# Patient Record
Sex: MALE | Race: WHITE | Employment: FULL TIME | URBAN - METROPOLITAN AREA
[De-identification: names, ages, dates, MRNs, and addresses within clinical notes are randomized per-mention and may not be internally consistent; named-entity substitution may affect disease eponyms.]

---

## 2018-07-21 ENCOUNTER — HOSPITAL ENCOUNTER (EMERGENCY)
Age: 33
Discharge: HOME | End: 2018-07-21
Attending: FAMILY MEDICINE
Payer: COMMERCIAL

## 2018-07-21 VITALS
DIASTOLIC BLOOD PRESSURE: 86 MMHG | BODY MASS INDEX: 41.64 KG/M2 | TEMPERATURE: 97.6 F | SYSTOLIC BLOOD PRESSURE: 142 MMHG | WEIGHT: 314.2 LBS | OXYGEN SATURATION: 100 % | HEIGHT: 73 IN | RESPIRATION RATE: 14 BRPM | HEART RATE: 72 BPM

## 2018-07-21 DIAGNOSIS — S39.012A STRAIN OF LUMBAR REGION, INITIAL ENCOUNTER: ICD-10-CM
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
## 2018-07-21 PROCEDURE — 99283 EMERGENCY DEPT VISIT LOW MDM: CPT

## 2018-07-21 RX ORDER — DEXAMETHASONE 4 MG/1
4 TABLET ORAL ONCE
Status: DISCONTINUED | OUTPATIENT
Start: 2018-07-21 | End: 2018-07-22 | Stop reason: HOSPADM

## 2018-07-21 RX ORDER — ACETAMINOPHEN 500 MG
1000 TABLET ORAL ONCE
Status: DISCONTINUED | OUTPATIENT
Start: 2018-07-21 | End: 2018-07-22 | Stop reason: HOSPADM

## 2018-07-21 RX ORDER — NAPROXEN 500 MG/1
500 TABLET ORAL 2 TIMES DAILY WITH MEALS
Qty: 10 TABLET | Refills: 0 | Status: SHIPPED | OUTPATIENT
Start: 2018-07-21 | End: 2020-03-07

## 2018-07-21 RX ORDER — CYCLOBENZAPRINE HCL 10 MG
10 TABLET ORAL 3 TIMES DAILY PRN
Qty: 6 TABLET | Refills: 0 | Status: SHIPPED | OUTPATIENT
Start: 2018-07-21 | End: 2018-07-24

## 2018-07-21 RX ORDER — CYCLOBENZAPRINE HCL 10 MG
10 TABLET ORAL ONCE
Status: DISCONTINUED | OUTPATIENT
Start: 2018-07-21 | End: 2018-07-22 | Stop reason: HOSPADM

## 2018-07-21 ASSESSMENT — PAIN DESCRIPTION - ORIENTATION: ORIENTATION: RIGHT

## 2018-07-21 ASSESSMENT — PAIN DESCRIPTION - PAIN TYPE
TYPE: ACUTE PAIN
TYPE: ACUTE PAIN

## 2018-07-21 ASSESSMENT — PAIN DESCRIPTION - DESCRIPTORS
DESCRIPTORS: ACHING
DESCRIPTORS: ACHING

## 2018-07-21 ASSESSMENT — PAIN DESCRIPTION - LOCATION
LOCATION: BACK
LOCATION: BACK;HEAD

## 2018-07-21 ASSESSMENT — PAIN - FUNCTIONAL ASSESSMENT: PAIN_FUNCTIONAL_ASSESSMENT: 0-10

## 2018-07-21 ASSESSMENT — PAIN SCALES - GENERAL
PAINLEVEL_OUTOF10: 5
PAINLEVEL_OUTOF10: 1

## 2018-07-21 ASSESSMENT — PAIN DESCRIPTION - FREQUENCY: FREQUENCY: CONTINUOUS

## 2018-07-22 NOTE — ED PROVIDER NOTES
Triage Chief Complaint:   Fall (fell down steps hit back and head no loc)    [place]:  Flower Lynch is a 26 y.o. male that presents who approximately 3 hours ago slipped on a step and bounced down 3 steps. He also hit the back of his head. No loss of conscious. No neck pain. No upper extremity lower extremity radicular symptoms. No nausea vomiting. No chest or abdominal pain. No upper extremity pain. No lower extremity pain. He is had normal activity and has been eating and drinking normally since then. He presents because the last time he had a similar injury when he woke the next day he had muscle spasms in his back and he would like to avoid this. ROS:  General:  no weakness  Eyes:  No recent vison changes  ENT:  No sore throat, no nasal congestion, no hearing changes  Cardiovascular:  No chest pain  Respiratory:  No shortness of breath  Gastrointestinal:  No pain, no nausea, no vomiting  Musculoskeletal:  No muscle pain, no joint pain  Skin:  No rash, No wounds  Neurologic:  No speech problems, no headache, no extremity numbness, no extremity tingling, no extremity weakness  Genitourinary: no hematuria  Extremities:  no edema, no pain    Past Medical History:   Diagnosis Date    Asthma      History reviewed. No pertinent surgical history. History reviewed. No pertinent family history. Social History     Social History    Marital status:      Spouse name: N/A    Number of children: N/A    Years of education: N/A     Occupational History    Not on file. Social History Main Topics    Smoking status: Current Every Day Smoker     Packs/day: 0.50     Types: Cigarettes    Smokeless tobacco: Not on file    Alcohol use Not on file    Drug use: No    Sexual activity: Not on file     Other Topics Concern    Not on file     Social History Narrative    No narrative on file     No current facility-administered medications for this encounter.       Current Outpatient Prescriptions Radiographs (if obtained):  [] The following radiograph was interpreted by myself in the absence of a radiologist:   [] Radiologist's Report Reviewed:  No orders to display         EKG (if obtained): (All EKG's are interpreted by myself in the absence of a cardiologist)    Chart review shows recent radiographs:  No results found. MDM:  49-year-old male with history and physical as above. His physical exam is essentially entirely normal.  Head CT not performed as he is negative by [place] criteria. Cervical spine imaging not performed as he is negative by nexus ×5. He has no midline thoracic or lumbar tenderness. He has no bruising. He has no evidence of radicular symptoms. His gait is normal.  He has no ataxia. He is mentating normally. Patient discharged home with p.r.n. Flexeril for 3 days as well as scheduled Naprosyn. Continuing to remain active and not lying in bed, continuing to work with no time off, and return to the emergency department instructions reviewed with the patient and his wife. I estimate there is LOW risk for (including but not limited to) INTRACRANIAL HEMORRHAGE, CENTRAL CORD SYNDROME, UNSTABLE SPINE FRACTURE, AORTIC DISSECTION, PERFORATED VISCUS, SOLID ORGAN LACERATION, CAUDA EQUINA, UNSTABLE PELVIC FRACTURE, COMPARTMENT SYNDROME, ACUTE ARTERIAL INJURY, or OPEN FRACTURE thus I consider the discharge disposition reasonable. Michell Sloan (or their surrogate) and I have discussed the diagnosis and risks, and we agree with discharging home with close follow-up. We also discussed returning to the Emergency Department immediately if new or worsening symptoms occur. We have discussed the symptoms which are most concerning that necessitate immediate return. Clinical Impression:  1. Fall, initial encounter    2. Strain of lumbar region, initial encounter      Disposition referral (if applicable):  Richard Weber MD  [address] [id number]  [phone]    Schedule an appointment as soon as possible for a visit       Disposition medications (if applicable):  Discharge Medication List as of 7/21/2018 11:12 PM      START taking these medications    Details   cyclobenzaprine (FLEXERIL) 10 MG tablet Take 1 tablet by mouth 3 times daily as needed for Muscle spasms, Disp-6 tablet, R-0Print      naproxen (NAPROSYN) 500 MG tablet Take 1 tablet by mouth 2 times daily (with meals) for 5 days, Disp-10 tablet, R-0Print             Comment: Please note this report has been produced using speech recognition software and may contain errors related to that system including errors in grammar, punctuation, and spelling, as well as words and phrases that may be inappropriate. If there are any questions or concerns please feel free to contact the dictating provider for clarification.       Juan F Braga MD  07/22/18 1369

## 2020-03-07 ENCOUNTER — HOSPITAL ENCOUNTER (EMERGENCY)
Age: 35
Discharge: HOME | End: 2020-03-08
Attending: EMERGENCY MEDICINE
Payer: COMMERCIAL

## 2020-03-07 VITALS
WEIGHT: 313.4 LBS | OXYGEN SATURATION: 98 % | HEART RATE: 92 BPM | HEIGHT: 73 IN | TEMPERATURE: 98.3 F | SYSTOLIC BLOOD PRESSURE: 137 MMHG | BODY MASS INDEX: 41.54 KG/M2 | DIASTOLIC BLOOD PRESSURE: 91 MMHG

## 2020-03-07 PROCEDURE — 99282 EMERGENCY DEPT VISIT SF MDM: CPT

## 2020-03-08 NOTE — ED NOTES
Pt to ed with c/o blood in stool, states has it last wk and more tonight.  Exam per MD.     Мария Mueller RN  03/07/20 9844

## 2020-03-17 ENCOUNTER — HOSPITAL ENCOUNTER (OUTPATIENT)
Age: 35
Setting detail: OUTPATIENT SURGERY
Discharge: HOME | End: 2020-03-17
Attending: INTERNAL MEDICINE | Admitting: INTERNAL MEDICINE
Payer: COMMERCIAL

## 2020-03-17 VITALS
HEART RATE: 56 BPM | SYSTOLIC BLOOD PRESSURE: 98 MMHG | DIASTOLIC BLOOD PRESSURE: 64 MMHG | OXYGEN SATURATION: 97 % | RESPIRATION RATE: 20 BRPM

## 2020-03-17 PROCEDURE — 2709999900 HC NON-CHARGEABLE SUPPLY: Performed by: INTERNAL MEDICINE

## 2020-03-17 PROCEDURE — 3609010200 HC COLONOSCOPY ABLATION TUMOR POLYP/OTHER LES: Performed by: INTERNAL MEDICINE

## 2020-03-17 PROCEDURE — 99153 MOD SED SAME PHYS/QHP EA: CPT | Performed by: INTERNAL MEDICINE

## 2020-03-17 PROCEDURE — 3609010300 HC COLONOSCOPY W/BIOPSY SINGLE/MULTIPLE: Performed by: INTERNAL MEDICINE

## 2020-03-17 PROCEDURE — 7100000011 HC PHASE II RECOVERY - ADDTL 15 MIN: Performed by: INTERNAL MEDICINE

## 2020-03-17 PROCEDURE — 2720000010 HC SURG SUPPLY STERILE: Performed by: INTERNAL MEDICINE

## 2020-03-17 PROCEDURE — 3609012400 HC EGD TRANSORAL BIOPSY SINGLE/MULTIPLE: Performed by: INTERNAL MEDICINE

## 2020-03-17 PROCEDURE — 6360000002 HC RX W HCPCS: Performed by: INTERNAL MEDICINE

## 2020-03-17 PROCEDURE — 6370000000 HC RX 637 (ALT 250 FOR IP): Performed by: INTERNAL MEDICINE

## 2020-03-17 PROCEDURE — 88305 TISSUE EXAM BY PATHOLOGIST: CPT

## 2020-03-17 PROCEDURE — 7100000010 HC PHASE II RECOVERY - FIRST 15 MIN: Performed by: INTERNAL MEDICINE

## 2020-03-17 PROCEDURE — 2500000003 HC RX 250 WO HCPCS: Performed by: INTERNAL MEDICINE

## 2020-03-17 PROCEDURE — 99152 MOD SED SAME PHYS/QHP 5/>YRS: CPT | Performed by: INTERNAL MEDICINE

## 2020-03-17 RX ORDER — MUPIROCIN CALCIUM 20 MG/G
CREAM TOPICAL 3 TIMES DAILY
COMMUNITY
End: 2022-08-19

## 2020-03-17 RX ORDER — SODIUM CHLORIDE 9 MG/ML
INJECTION, SOLUTION INTRAVENOUS CONTINUOUS
Status: DISCONTINUED | OUTPATIENT
Start: 2020-03-17 | End: 2020-03-17 | Stop reason: HOSPADM

## 2020-03-17 RX ORDER — TERBINAFINE HYDROCHLORIDE 250 MG/1
250 TABLET ORAL DAILY
COMMUNITY
Start: 2020-02-29 | End: 2022-08-19

## 2020-03-17 RX ORDER — FENTANYL CITRATE 50 UG/ML
INJECTION, SOLUTION INTRAMUSCULAR; INTRAVENOUS PRN
Status: DISCONTINUED | OUTPATIENT
Start: 2020-03-17 | End: 2020-03-17 | Stop reason: ALTCHOICE

## 2020-03-17 RX ORDER — MIDAZOLAM HYDROCHLORIDE 1 MG/ML
INJECTION INTRAMUSCULAR; INTRAVENOUS PRN
Status: DISCONTINUED | OUTPATIENT
Start: 2020-03-17 | End: 2020-03-17 | Stop reason: ALTCHOICE

## 2020-03-17 ASSESSMENT — PAIN - FUNCTIONAL ASSESSMENT
PAIN_FUNCTIONAL_ASSESSMENT: 0-10
PAIN_FUNCTIONAL_ASSESSMENT: 0-10
PAIN_FUNCTIONAL_ASSESSMENT: FACES
PAIN_FUNCTIONAL_ASSESSMENT: 0-10

## 2020-03-17 NOTE — H&P
History and Physical / Pre-Sedation Assessment    Patient:  Lizzeth Liu   :   1985     Intended Procedure:  egd and colonoscopy    HPI: rectal bleed. Red and dark  Diarrhea. Abdominal pain. Past Medical History:   has a past medical history of Asthma. Past Surgical History:   has a past surgical history that includes Pilonidal cyst excision (); Dental surgery; and Colonoscopy (). Medications:  Prior to Admission medications    Medication Sig Start Date End Date Taking? Authorizing Provider   terbinafine (LAMISIL) 250 MG tablet Take 250 mg by mouth daily 20   Historical Provider, MD   mupirocin (BACTROBAN) 2 % cream Apply topically 3 times daily Apply topically 3 times daily. Historical Provider, MD       Family History:  family history is not on file. Social History:   reports that he has been smoking cigarettes. He has been smoking about 0.50 packs per day. He has never used smokeless tobacco. He reports that he does not use drugs. Allergies:  No known allergies    ROS:  twelve point system review was unremarkable except for above noted history. Nurses notes reviewed and agreed. Medications reviewed    Physical Exam:  Vital Signs: There were no vitals taken for this visit. Skin: normal  HEENT: normal  Neck: supple. No adenopathy. No thyromegaly. No JVD. Pulmonary:Normal  Cardiac:Normal  Abdomen:Normal  MS: normal  Neuro: normal  Ext: no edema. Pulses normal    Pre-Procedure Assessment / Plan:  ASA 2 - Patient with mild systemic disease with no functional limitations  Mallampati Airway Assessment:  Mallampati Class I - (soft palate, fauces, uvula & anterior/posterior tonsillar pillars are visible)  Level of Sedation Plan: Moderate sedation  Post Procedure plan: Return to same level of care    I assessed the patient and find that the patient is in satisfactory condition to proceed with the planned procedure and sedation plan.     I have explained the risk, benefits,

## 2020-03-18 NOTE — OP NOTE
[address]               [address] [place], [address]                                OPERATIVE REPORT    PATIENT NAME: Melissa Mock                      :        1985  MED REC NO:   [id number]                          ROOM:  ACCOUNT NO:   [de-identified]                           ADMIT DATE: 2020  PROVIDER:     Kayley Brito MD    DATE OF PROCEDURE:  2020    SURGEON:  Kayley Brito MD    INDICATION FOR PROCEDURE:  A 49-year-old man who presented with  diarrhea, rectal bleed, and abdominal pain. He described melena and red  blood with the stool. Today, he is having endoscopic evaluation. EGD:  With the patient in the left lateral position and after sedation  with 100 mcg of fentanyl and 7 mg of Versed IV, the Olympus video  endoscope was introduced into the esophagus and advanced toward the  stomach. Esophagus was normal.  Stomach was carefully inspected. It  was normal.  Biopsies were obtained for Helicobacter pylori. A small  polyp was also removed with the biopsy forceps. The duodenum was  normal.  Scope was then removed without complication. COLONOSCOPY:  The Olympus video colonoscope was then inserted into the  rectum and carefully advanced to the cecum. There was no sign of  carcinoma, inflammatory bowel, or polyps. An angiodysplasia was noted  in the transverse colon. APC ablation was performed. Random biopsies  were obtained to rule out collagenous/lymphocytic/microscopic colitis. The scope was then removed without complication. IMPRESSION:  1. Normal esophagogastroduodenoscopy. 2.  Angiodysplasia of the transverse colon. APC ablation was performed. Kenroy Wood MD    D: 2020 13:32:48       T: 2020 15:34:00     [email or web address]  Job#: [id number]     Doc#: [id number]    CC:   Sherly Hale MD

## 2020-03-29 ENCOUNTER — HOSPITAL ENCOUNTER (EMERGENCY)
Age: 35
Discharge: HOME | End: 2020-03-29
Attending: EMERGENCY MEDICINE
Payer: COMMERCIAL

## 2020-03-29 VITALS
OXYGEN SATURATION: 100 % | TEMPERATURE: 97.7 F | WEIGHT: 315 LBS | BODY MASS INDEX: 41.75 KG/M2 | HEIGHT: 73 IN | HEART RATE: 63 BPM | SYSTOLIC BLOOD PRESSURE: 130 MMHG | RESPIRATION RATE: 12 BRPM | DIASTOLIC BLOOD PRESSURE: 91 MMHG

## 2020-03-29 LAB — S PYO AG THROAT QL: NEGATIVE

## 2020-03-29 PROCEDURE — 87880 STREP A ASSAY W/OPTIC: CPT

## 2020-03-29 PROCEDURE — 87081 CULTURE SCREEN ONLY: CPT

## 2020-03-29 PROCEDURE — 99283 EMERGENCY DEPT VISIT LOW MDM: CPT

## 2020-03-31 LAB — S PYO THROAT QL CULT: NORMAL

## 2020-06-04 ENCOUNTER — OFFICE VISIT (OUTPATIENT)
Dept: PRIMARY CARE CLINIC | Age: 35
End: 2020-06-04

## 2020-06-06 LAB
SARS-COV-2: NOT DETECTED
SOURCE: NORMAL

## 2020-06-08 ENCOUNTER — ANESTHESIA EVENT (OUTPATIENT)
Dept: ENDOSCOPY | Age: 35
End: 2020-06-08
Payer: COMMERCIAL

## 2020-06-10 ENCOUNTER — HOSPITAL ENCOUNTER (OUTPATIENT)
Age: 35
Setting detail: OUTPATIENT SURGERY
Discharge: HOME | End: 2020-06-10
Attending: INTERNAL MEDICINE | Admitting: INTERNAL MEDICINE
Payer: COMMERCIAL

## 2020-06-10 ENCOUNTER — ANESTHESIA (OUTPATIENT)
Dept: ENDOSCOPY | Age: 35
End: 2020-06-10
Payer: COMMERCIAL

## 2020-06-10 VITALS
TEMPERATURE: 97.2 F | OXYGEN SATURATION: 99 % | HEIGHT: 73 IN | DIASTOLIC BLOOD PRESSURE: 76 MMHG | SYSTOLIC BLOOD PRESSURE: 118 MMHG | BODY MASS INDEX: 41.75 KG/M2 | RESPIRATION RATE: 16 BRPM | HEART RATE: 64 BPM | WEIGHT: 315 LBS

## 2020-06-10 VITALS
RESPIRATION RATE: 17 BRPM | DIASTOLIC BLOOD PRESSURE: 73 MMHG | SYSTOLIC BLOOD PRESSURE: 112 MMHG | OXYGEN SATURATION: 98 %

## 2020-06-10 PROCEDURE — 7100000010 HC PHASE II RECOVERY - FIRST 15 MIN: Performed by: INTERNAL MEDICINE

## 2020-06-10 PROCEDURE — 6360000002 HC RX W HCPCS: Performed by: NURSE ANESTHETIST, CERTIFIED REGISTERED

## 2020-06-10 PROCEDURE — 2580000003 HC RX 258: Performed by: ANESTHESIOLOGY

## 2020-06-10 PROCEDURE — 3700000001 HC ADD 15 MINUTES (ANESTHESIA): Performed by: INTERNAL MEDICINE

## 2020-06-10 PROCEDURE — 3609014000 HC ENTEROSCOPY BIOPSY: Performed by: INTERNAL MEDICINE

## 2020-06-10 PROCEDURE — 7100000011 HC PHASE II RECOVERY - ADDTL 15 MIN: Performed by: INTERNAL MEDICINE

## 2020-06-10 PROCEDURE — 2709999900 HC NON-CHARGEABLE SUPPLY: Performed by: INTERNAL MEDICINE

## 2020-06-10 PROCEDURE — 88305 TISSUE EXAM BY PATHOLOGIST: CPT

## 2020-06-10 PROCEDURE — 3700000000 HC ANESTHESIA ATTENDED CARE: Performed by: INTERNAL MEDICINE

## 2020-06-10 RX ORDER — PROPOFOL 10 MG/ML
INJECTION, EMULSION INTRAVENOUS PRN
Status: DISCONTINUED | OUTPATIENT
Start: 2020-06-10 | End: 2020-06-10 | Stop reason: SDUPTHER

## 2020-06-10 RX ORDER — SODIUM CHLORIDE, SODIUM LACTATE, POTASSIUM CHLORIDE, CALCIUM CHLORIDE 600; 310; 30; 20 MG/100ML; MG/100ML; MG/100ML; MG/100ML
INJECTION, SOLUTION INTRAVENOUS CONTINUOUS
Status: DISCONTINUED | OUTPATIENT
Start: 2020-06-10 | End: 2020-06-10 | Stop reason: HOSPADM

## 2020-06-10 RX ORDER — LIDOCAINE HYDROCHLORIDE 20 MG/ML
INJECTION, SOLUTION INTRAVENOUS PRN
Status: DISCONTINUED | OUTPATIENT
Start: 2020-06-10 | End: 2020-06-10 | Stop reason: SDUPTHER

## 2020-06-10 RX ADMIN — LIDOCAINE HYDROCHLORIDE 100 MG: 20 INJECTION, SOLUTION INTRAVENOUS at 09:33

## 2020-06-10 RX ADMIN — PROPOFOL 200 MG: 10 INJECTION, EMULSION INTRAVENOUS at 09:33

## 2020-06-10 RX ADMIN — SODIUM CHLORIDE, POTASSIUM CHLORIDE, SODIUM LACTATE AND CALCIUM CHLORIDE: 600; 310; 30; 20 INJECTION, SOLUTION INTRAVENOUS at 08:00

## 2020-06-10 RX ADMIN — SODIUM CHLORIDE, SODIUM LACTATE, POTASSIUM CHLORIDE, AND CALCIUM CHLORIDE: 600; 310; 30; 20 INJECTION, SOLUTION INTRAVENOUS at 09:03

## 2020-06-10 RX ADMIN — PROPOFOL 100 MG: 10 INJECTION, EMULSION INTRAVENOUS at 09:41

## 2020-06-10 ASSESSMENT — LIFESTYLE VARIABLES: SMOKING_STATUS: 1

## 2020-06-10 ASSESSMENT — PULMONARY FUNCTION TESTS
PIF_VALUE: 0
PIF_VALUE: 1
PIF_VALUE: 0
PIF_VALUE: 0

## 2020-06-10 ASSESSMENT — PAIN SCALES - GENERAL
PAINLEVEL_OUTOF10: 0

## 2020-06-10 ASSESSMENT — PAIN - FUNCTIONAL ASSESSMENT: PAIN_FUNCTIONAL_ASSESSMENT: 0-10

## 2020-06-10 NOTE — ANESTHESIA PRE PROCEDURE
Cardiovascular:  Exercise tolerance: good (>4 METS),       (-) hypertension, past MI,  angina and  WANG                Neuro/Psych:      (-) seizures           GI/Hepatic/Renal:   (+) morbid obesity     (-) GERD       Endo/Other:        (-) diabetes mellitus               Abdominal:           Vascular:                                        Anesthesia Plan      general     ASA 3     (-npo MN  -denies any cardiac history, no chest pain or palp  )  Induction: intravenous. MIPS: Postoperative opioids intended. Anesthetic plan and risks discussed with patient. Plan discussed with CRNA.     Attending anesthesiologist reviewed and agrees with Pre Eval content            Silvia Wise MD   6/10/2020

## 2020-06-10 NOTE — PROGRESS NOTES
56 Dr. Love Loss here to see pt. DC instructions given to pt and wife with verbalization of understanding of pt and wife. Ambulatory Surgery/Procedure Discharge Note    Vitals:    06/10/20 1030   BP: 118/76   Pulse: 64   Resp: 16   Temp: 97.2 °F (36.2 °C)   SpO2:        In: 620 [P.O.:320; I.V.:300]  Out: -     Restroom use offered before discharge. Yes    Pain assessment:  level of pain (1-10, 10 severe),   Pain Level: 0    No c/o distress. Tolerates po well. Drinking applejuice and eating crackers. Pt and wife states 'ready to go home'. Patient discharged to home/self care.  Patient discharged via wheel chair by transporter to waiting family/S.O.       6/10/2020 10:42 AM

## 2022-08-19 ENCOUNTER — HOSPITAL ENCOUNTER (EMERGENCY)
Age: 37
Discharge: HOME | End: 2022-08-19
Attending: EMERGENCY MEDICINE
Payer: COMMERCIAL

## 2022-08-19 VITALS
WEIGHT: 285.7 LBS | SYSTOLIC BLOOD PRESSURE: 132 MMHG | DIASTOLIC BLOOD PRESSURE: 77 MMHG | BODY MASS INDEX: 37.86 KG/M2 | HEIGHT: 73 IN | OXYGEN SATURATION: 99 % | HEART RATE: 99 BPM | RESPIRATION RATE: 16 BRPM | TEMPERATURE: 98.8 F

## 2022-08-19 DIAGNOSIS — S60.512A CAT SCRATCH OF LEFT HAND, INITIAL ENCOUNTER: Primary | ICD-10-CM
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
## 2022-08-19 PROCEDURE — 99283 EMERGENCY DEPT VISIT LOW MDM: CPT

## 2022-08-19 PROCEDURE — 6370000000 HC RX 637 (ALT 250 FOR IP): Performed by: EMERGENCY MEDICINE

## 2022-08-19 RX ORDER — CLINDAMYCIN HYDROCHLORIDE 300 MG/1
300 CAPSULE ORAL ONCE
Status: COMPLETED | OUTPATIENT
Start: 2022-08-19 | End: 2022-08-19

## 2022-08-19 RX ORDER — CLINDAMYCIN HYDROCHLORIDE 300 MG/1
300 CAPSULE ORAL 4 TIMES DAILY
Qty: 40 CAPSULE | Refills: 0 | Status: SHIPPED | OUTPATIENT
Start: 2022-08-19 | End: 2022-08-29

## 2022-08-19 RX ORDER — AMOXICILLIN AND CLAVULANATE POTASSIUM 875; 125 MG/1; MG/1
1 TABLET, FILM COATED ORAL 2 TIMES DAILY
Qty: 20 TABLET | Refills: 0 | Status: SHIPPED | OUTPATIENT
Start: 2022-08-19 | End: 2022-08-29

## 2022-08-19 RX ORDER — AMOXICILLIN AND CLAVULANATE POTASSIUM 875; 125 MG/1; MG/1
1 TABLET, FILM COATED ORAL ONCE
Status: COMPLETED | OUTPATIENT
Start: 2022-08-19 | End: 2022-08-19

## 2022-08-19 RX ADMIN — CLINDAMYCIN HYDROCHLORIDE 300 MG: 300 CAPSULE ORAL at 02:11

## 2022-08-19 RX ADMIN — AMOXICILLIN AND CLAVULANATE POTASSIUM 1 TABLET: 875; 125 TABLET, FILM COATED ORAL at 02:11

## 2022-08-19 ASSESSMENT — ENCOUNTER SYMPTOMS
FACIAL SWELLING: 0
WHEEZING: 0
VOMITING: 0
TROUBLE SWALLOWING: 0
BLOOD IN STOOL: 0
COLOR CHANGE: 0
BACK PAIN: 0
NAUSEA: 0
VOICE CHANGE: 0
STRIDOR: 0
PHOTOPHOBIA: 0
ABDOMINAL PAIN: 0
SHORTNESS OF BREATH: 0

## 2022-08-19 ASSESSMENT — PAIN - FUNCTIONAL ASSESSMENT: PAIN_FUNCTIONAL_ASSESSMENT: NONE - DENIES PAIN

## 2022-08-19 NOTE — ED PROVIDER NOTES
[address] [facility]  eMERGENCY dEPARTMENT eNCOUnter      Pt Name: Gamal Meyer  MRN: [id number]  [place] 1985  Date of evaluation: 8/19/2022  Provider: Gold Rao MD    CHIEF COMPLAINT     Cat scratch to left hand      HISTORY OF PRESENT ILLNESS   (Location/Symptom, Timing/Onset, Context/Setting, Quality, Duration, Modifying Factors, Severity)  Note limiting factors. Gamal Meyer is a 42 y.o. male who presents with pain and redness to his left pinky finger after being scratched by his cat 3 days ago. The patient reports 2 days ago he was breaking up a fight between his Dog and his cat scratched him. He reports there was no bite but only a scratch. He reports mild spreading redness versus this incident. He denies any fast spreading redness, fever, or pus drainage. He denies any numbness or weakness. He reports that his symptoms are mild constant and slowly worsening. HPI    Nursing Notes were reviewed. REVIEW OFSYSTEMS    (2-9 systems for level 4, 10 or more for level 5)     Review of Systems   Constitutional:  Negative for appetite change, fever and unexpected weight change. HENT:  Negative for facial swelling, trouble swallowing and voice change. Eyes:  Negative for photophobia and visual disturbance. Respiratory:  Negative for shortness of breath, wheezing and stridor. Cardiovascular:  Negative for chest pain and palpitations. Gastrointestinal:  Negative for abdominal pain, blood in stool, nausea and vomiting. Genitourinary:  Negative for difficulty urinating and dysuria. Musculoskeletal:  Negative for back pain, gait problem and neck pain. Skin:  Positive for rash. Negative for color change and wound. Neurological:  Negative for seizures, syncope and speech difficulty. Psychiatric/Behavioral:  Negative for self-injury and suicidal ideas. Except as noted above the remainder of the review of systems was reviewed and negative.        PAST MEDICAL HISTORY     Past Medical History:   Diagnosis Date    Asthma     LAST FLARE UP 2 YEARS AGO. Black stools     Diarrhea     Overweight          SURGICAL HISTORY       Past Surgical History:   Procedure Laterality Date    COLONOSCOPY  2007    COLONOSCOPY N/A 3/17/2020    COLONOSCOPY  ABLATION TO TRANSVERSE COLON AVM performed by Nevaeh Humphrey MD at [facility]  3/17/2020    COLONOSCOPY WITH BIOPSY performed by Nevaeh Humphrey MD at [address] [place] [address] CYST EXCISION  2007    UPPER GASTROINTESTINAL ENDOSCOPY N/A 3/17/2020    EGD BIOPSY performed by Nevaeh Humphrey MD at [address] [place] [address] 6/10/2020    ENTEROSCOPY PUSH BIOPSY performed by Nevaeh Humphrey MD at [facility]       Previous Medications    MUPIROCIN (BACTROBAN) 2 % CREAM    Apply topically 3 times daily Apply topically 3 times daily. TERBINAFINE (LAMISIL) 250 MG TABLET    Take 250 mg by mouth daily       ALLERGIES     No known allergies    FAMILY HISTORY     No family history on file. SOCIAL HISTORY       Social History     Socioeconomic History    Marital status:    Tobacco Use    Smoking status: Every Day     Packs/day: 0.50     Types: Cigarettes    Smokeless tobacco: Never   Substance and Sexual Activity    Alcohol use: Not Currently    Drug use: No         PHYSICAL EXAM    (up to 7 for level 4, 8 or more for level 5)     ED Triage Vitals [08/19/22 0115]   BP Temp Temp Source Heart Rate Resp SpO2 Height Weight   132/77 98.8 °F (37.1 °C) Oral 99 16 99 % 6' 1\" (1.854 m) 285 lb 11.2 oz (129.6 kg)       Physical Exam  Vitals and nursing note reviewed. Constitutional:       General: He is not in acute distress. Appearance: He is well-developed. HENT:      Head: Normocephalic and atraumatic. Eyes:      Conjunctiva/sclera: Conjunctivae normal.   Neck:      Vascular: No JVD. Trachea: No tracheal deviation. Cardiovascular:      Rate and Rhythm: Normal rate. Pulses: Normal pulses. Comments: 2+ radial ulnar pulses to left upper extremity. Cap refill intact to fingers of left hand including left pinky. Pulmonary:      Effort: Pulmonary effort is normal. No respiratory distress. Musculoskeletal:         General: Swelling and tenderness present. Comments: Pain erythema tenderness and swelling to left pinky finger. Patient able to fully extend and bend pinky finger. No fusiform swelling. No red streaking or purulent drainage. Skin:     General: Skin is warm and dry. Neurological:      General: No focal deficit present. Mental Status: He is alert and oriented to person, place, and time. Comments: Sensation present in ulnar, median, radial nerve distribution of the left upper extremity     EMERGENCY DEPARTMENT COURSE and DIFFERENTIAL DIAGNOSIS/MDM:   Vitals:    Vitals:    08/19/22 0115   BP: 132/77   Pulse: 99   Resp: 16   Temp: 98.8 °F (37.1 °C)   TempSrc: Oral   SpO2: 99%   Weight: 285 lb 11.2 oz (129.6 kg)   Height: 6' 1\" (1.854 m)     MDM  All vital signs within normal limits. Patient neurovascularly intact. Suspect mild cellulitis however broader differential diagnosis considered and discussed with the patient including flexor tenosynovitis, necrotizing soft tissue infection, sepsis, or abscess. We have discussed different options including laboratory evaluation and possible IV antibiotics however the patient reports he prefers to try p.o. antibiotics and will come back if his symptoms worsen. I feel the patient adequate understands the risk, benefits, alternatives of this decision has capacity to participate in shared medical decision making. Dual p.o. antibiotics prescribed to the patient and very strict ER return precautions given. Patient is aware of the risk of this option and the fact that he may need to return if his symptoms worsen.   Patient expresses understanding and agreement with this plan and is discharged home. 8/22/22  7:07 PM: Called patient to check up on him make sure that the infection was not spreading that he did not need to represent for IV antibiotics. Patient reports substantial improvement in his symptoms. He is encouraged to continue to take the antibiotics and come back to the emergency department if his symptoms worsen for reevaluation and possible IV antibiotics. Patient expresses understanding and agreement with this plan. Procedures    FINAL IMPRESSION      1. Cat scratch of left hand, initial encounter          DISPOSITION/PLAN   DISPOSITION Decision To Discharge 08/19/2022 01:49:30 AM      PATIENT REFERRED TO:  Angeles Long MD  [facility]. [address] [facility]  [phone]    In 3 days      [address]  [phone]    If symptoms worsen      MEDICATIONS:  New Prescriptions    AMOXICILLIN-CLAVULANATE (AUGMENTIN) 875-125 MG PER TABLET    Take 1 tablet by mouth 2 times daily for 10 days    CLINDAMYCIN (CLEOCIN) 300 MG CAPSULE    Take 1 capsule by mouth 4 times daily for 10 days          (Please note that portions of this note were completed with a voice recognition program.  Efforts were made to edit the dictations but occasionally words aremis-transcribed. )    Diana Meyer MD (electronically signed)  Attending Emergency Physician            Diana Meyer MD  08/19/22 0155       Diana Meyer MD  08/22/22 Pal Dempsey

## 2022-08-19 NOTE — DISCHARGE INSTRUCTIONS
Please watch your hand closely and if your symptoms worsen come back to the emergency department for IV antibiotics.

## 2022-08-19 NOTE — ED NOTES
Pt dc/d with instructions , rx's and work note in stable condition, ambulatory to lobby. Home per ride.       Omaira Gutierrez RN  08/19/22 6667

## 2022-08-19 NOTE — Clinical Note
Antoine Bumpers was seen and treated in our emergency department on 8/19/2022. He may return to work on 08/21/2022. If you have any questions or concerns, please don't hesitate to call.       Devon Gomez MD

## 2023-11-10 ENCOUNTER — HOSPITAL ENCOUNTER (EMERGENCY)
Age: 38
Discharge: HOME | End: 2023-11-10
Attending: EMERGENCY MEDICINE

## 2023-11-10 VITALS
HEART RATE: 80 BPM | HEIGHT: 73 IN | TEMPERATURE: 98.9 F | RESPIRATION RATE: 18 BRPM | OXYGEN SATURATION: 96 % | WEIGHT: 314.6 LBS | SYSTOLIC BLOOD PRESSURE: 125 MMHG | BODY MASS INDEX: 41.69 KG/M2 | DIASTOLIC BLOOD PRESSURE: 80 MMHG

## 2023-11-10 DIAGNOSIS — B34.9 ACUTE VIRAL SYNDROME: ICD-10-CM

## 2023-11-10 DIAGNOSIS — U07.1 COVID-19: Primary | ICD-10-CM

## 2023-11-10 LAB
FLUAV RNA UPPER RESP QL NAA+PROBE: NEGATIVE
FLUBV AG NPH QL: NEGATIVE
S PYO AG THROAT QL: NEGATIVE
SARS-COV-2 RDRP RESP QL NAA+PROBE: DETECTED

## 2023-11-10 PROCEDURE — 87081 CULTURE SCREEN ONLY: CPT

## 2023-11-10 PROCEDURE — 99283 EMERGENCY DEPT VISIT LOW MDM: CPT

## 2023-11-10 PROCEDURE — 87880 STREP A ASSAY W/OPTIC: CPT

## 2023-11-10 PROCEDURE — 87635 SARS-COV-2 COVID-19 AMP PRB: CPT

## 2023-11-10 PROCEDURE — 87804 INFLUENZA ASSAY W/OPTIC: CPT

## 2023-11-10 NOTE — ED PROVIDER NOTES
[address] Usama Chavez Officer, MD, am the primary clinician of record. CHIEF COMPLAINT  Chief Complaint   Patient presents with    Pharyngitis    Shortness of Breath        HISTORY OF PRESENT ILLNESS  Karime Potter is a 34 y.o. male  who presents to the ED complaining of cough malaise and some shortness of breath with sore throat and bilateral ear pain getting worse over the past 2 days. His son who is 25years old is positive for COVID-19. He has not taken a test for it. He has never been vaccinated. He has had fevers at home as well but is afebrile here. Cough is generally nonproductive. He reports some nasal congestion as well. No other complaints, modifying factors or associated symptoms. I have reviewed the following from the nursing documentation. Past Medical History:   Diagnosis Date    Asthma     LAST FLARE UP 2 YEARS AGO. Black stools     Diarrhea     Overweight      Past Surgical History:   Procedure Laterality Date    COLONOSCOPY  2007    COLONOSCOPY N/A 3/17/2020    COLONOSCOPY  ABLATION TO TRANSVERSE COLON AVM performed by Lydia Silva MD at [address]  3/17/2020    COLONOSCOPY WITH BIOPSY performed by Lydia Silva MD at [address] [facility] CYST EXCISION  2007    UPPER GASTROINTESTINAL ENDOSCOPY N/A 3/17/2020    EGD BIOPSY performed by Lydia Silva MD at [facility] N/A 6/10/2020    ENTEROSCOPY PUSH BIOPSY performed by Lydia Silva MD at [facility]. No pertinent family history.   Social History     Socioeconomic History    Marital status:      Spouse name: Not on file    Number of children: Not on file    Years of education: Not on file    Highest education level: Not on file   Occupational History    Not on file   Tobacco Use    Smoking status: Every Day     Packs/day: .5     Types: Cigarettes

## 2023-11-10 NOTE — ED NOTES
Patient given d/c instructions with return verbalization including medication. Emphasis on f/u, to return with worsening s/s. All answered, does not need work note. Reviewed Co vid isolation, also given written instructions. Pt ambulated to lobby with steady gait.      Keely Elliott RN  11/10/23 3413

## 2023-11-12 LAB — S PYO THROAT QL CULT: NORMAL

## 2023-11-13 LAB — S PYO THROAT QL CULT: NORMAL

## 2025-02-13 ENCOUNTER — HOSPITAL ENCOUNTER (EMERGENCY)
Age: 40
Discharge: HOME | End: 2025-02-13
Attending: STUDENT IN AN ORGANIZED HEALTH CARE EDUCATION/TRAINING PROGRAM
Payer: COMMERCIAL

## 2025-02-13 VITALS
HEART RATE: 93 BPM | WEIGHT: 315 LBS | OXYGEN SATURATION: 94 % | BODY MASS INDEX: 41.75 KG/M2 | SYSTOLIC BLOOD PRESSURE: 131 MMHG | HEIGHT: 73 IN | RESPIRATION RATE: 18 BRPM | DIASTOLIC BLOOD PRESSURE: 82 MMHG | TEMPERATURE: 100.8 F

## 2025-02-13 DIAGNOSIS — J10.1 INFLUENZA A: Primary | ICD-10-CM

## 2025-02-13 LAB
FLUAV RNA RESP QL NAA+PROBE: DETECTED
FLUBV RNA RESP QL NAA+PROBE: NOT DETECTED
SARS-COV-2 RNA RESP QL NAA+PROBE: NOT DETECTED

## 2025-02-13 PROCEDURE — 87636 SARSCOV2 & INF A&B AMP PRB: CPT

## 2025-02-13 PROCEDURE — 99283 EMERGENCY DEPT VISIT LOW MDM: CPT

## 2025-02-13 PROCEDURE — 6370000000 HC RX 637 (ALT 250 FOR IP): Performed by: STUDENT IN AN ORGANIZED HEALTH CARE EDUCATION/TRAINING PROGRAM

## 2025-02-13 RX ORDER — ACETAMINOPHEN 500 MG
1000 TABLET ORAL
Status: COMPLETED | OUTPATIENT
Start: 2025-02-13 | End: 2025-02-13

## 2025-02-13 RX ORDER — ONDANSETRON 4 MG/1
4 TABLET, ORALLY DISINTEGRATING ORAL ONCE
Status: COMPLETED | OUTPATIENT
Start: 2025-02-13 | End: 2025-02-13

## 2025-02-13 RX ORDER — ONDANSETRON 4 MG/1
4 TABLET, FILM COATED ORAL EVERY 8 HOURS PRN
Qty: 20 TABLET | Refills: 0 | Status: SHIPPED | OUTPATIENT
Start: 2025-02-13

## 2025-02-13 RX ADMIN — ACETAMINOPHEN 1000 MG: 500 TABLET ORAL at 18:36

## 2025-02-13 RX ADMIN — IBUPROFEN 600 MG: 200 TABLET, FILM COATED ORAL at 18:36

## 2025-02-13 RX ADMIN — ONDANSETRON 4 MG: 4 TABLET, ORALLY DISINTEGRATING ORAL at 18:36

## 2025-02-13 ASSESSMENT — PAIN - FUNCTIONAL ASSESSMENT: PAIN_FUNCTIONAL_ASSESSMENT: 0-10

## 2025-02-13 ASSESSMENT — LIFESTYLE VARIABLES
HOW OFTEN DO YOU HAVE A DRINK CONTAINING ALCOHOL: NEVER
HOW MANY STANDARD DRINKS CONTAINING ALCOHOL DO YOU HAVE ON A TYPICAL DAY: PATIENT DOES NOT DRINK

## 2025-02-13 ASSESSMENT — PAIN SCALES - GENERAL: PAINLEVEL_OUTOF10: 4

## 2025-02-13 NOTE — ED PROVIDER NOTES
[facility]  EMERGENCY DEPARTMENT ENCOUNTER          ATTENDING PHYSICIAN NOTE       Date of evaluation: 2/13/2025    Chief Complaint     Flu symptoms  (Patient stated that he has had N/V, fever, diarrhea, congested x  2 days)      History of Present Illness     Kaushal Vincent is a 39 y.o. male who presents with 2 days of nausea, vomiting, fever, congestion, and diarrhea.  He has been having a hard time keeping fluids down for the last 2 days which is what prompted him to come to the emergency department.  He took some Tylenol approximately 6 hours prior to arrival with mild improvement in his body aches.    ASSESSMENT / PLAN  (MEDICAL DECISION MAKING)     INITIAL VITALS: BP: 112/77, Temp: (!) 100.6 °F (38.1 °C), Pulse: (!) 112, Respirations: 18, SpO2: 95 %      Kaushal Vincent is a 39 y.o. male who presents with 2 days of nausea, vomiting, fever, congestion, and diarrhea.    Patient is notably positive for influenza A, I suspect this is the etiology of his symptoms.  On my initial assessment, he is hemodynamically stable and mildly febrile with a temperature of 101.2.  He    He is concerned that he is having trouble tolerating p.o.  As such we will administer Tylenol, ibuprofen, and a 4 mg ODT Zofran and reassess.    On reassessment, pt reports improvement in his symptoms and is tolerating PO.  Will prescribe a course of Zofran to use at home for continued nausea and encouraged take Tylenol and ibuprofen for fevers and bodyaches.  Recommend he follow-up with his PCP as needed and to not return to work until he has 24 hours fever free.  Recommend he return to the emergency department with inability tolerate p.o., severe chest pain or shortness of breath, or nausea or vomiting    Is this patient to be included in the SEP-1 core measure? No Exclusion criteria - the patient is NOT to be included for SEP-1 Core Measure due to: Viral etiology found or highly suspected (including COVID-19) without concomitant

## (undated) DEVICE — FORCEPS BX L240CM JAW DIA2.4MM ORNG L CAP W/ NDL DISP RAD

## (undated) DEVICE — FIAPC® PROBE W/ FILTER 2200 A OD 2.3MM/6.9FR; L 2.2M/7.2FT: Brand: ERBE

## (undated) DEVICE — ERBE NESSY®PLATE 170 SPLIT; 168CM²; CABLE 3M: Brand: ERBE

## (undated) DEVICE — FORCEPS BX L240CM WRK CHN 2.8MM STD CAP W/ NDL MIC MESH